# Patient Record
Sex: MALE | Race: WHITE | URBAN - METROPOLITAN AREA
[De-identification: names, ages, dates, MRNs, and addresses within clinical notes are randomized per-mention and may not be internally consistent; named-entity substitution may affect disease eponyms.]

---

## 2022-07-14 ENCOUNTER — NEW PATIENT (OUTPATIENT)
Dept: URBAN - METROPOLITAN AREA CLINIC 49 | Facility: CLINIC | Age: 77
End: 2022-07-14

## 2022-07-14 DIAGNOSIS — D31.32: ICD-10-CM

## 2022-07-14 DIAGNOSIS — H43.813: ICD-10-CM

## 2022-07-14 DIAGNOSIS — H52.4: ICD-10-CM

## 2022-07-14 DIAGNOSIS — H18.513: ICD-10-CM

## 2022-07-14 DIAGNOSIS — H26.493: ICD-10-CM

## 2022-07-14 PROCEDURE — 92004 COMPRE OPH EXAM NEW PT 1/>: CPT

## 2022-07-14 PROCEDURE — 92015 DETERMINE REFRACTIVE STATE: CPT

## 2022-07-14 ASSESSMENT — VISUAL ACUITY
OD_GLARE: 20/50
OS_CC: 20/20
OD_SC: J1
OS_GLARE: 20/20
OU_CC: J1+
OU_SC: J1
OS_CC: J1+
OS_SC: J1
OS_SC: 20/20
OD_SC: 20/20
OU_SC: 20/20
OD_GLARE: 20/20
OU_CC: 20/20
OD_CC: J1+
OS_GLARE: 20/40
OD_CC: 20/20-1

## 2022-07-14 ASSESSMENT — KERATOMETRY
OS_AXISANGLE_DEGREES: 0
OS_AXISANGLE2_DEGREES: 90
OD_K1POWER_DIOPTERS: 45.00
OS_K1POWER_DIOPTERS: 44.75
OD_K2POWER_DIOPTERS: 45.25
OD_AXISANGLE2_DEGREES: 78
OS_K2POWER_DIOPTERS: 44.75
OD_AXISANGLE_DEGREES: 168

## 2022-07-14 ASSESSMENT — TONOMETRY
OD_IOP_MMHG: 12
OS_IOP_MMHG: 12

## 2022-10-10 NOTE — PATIENT DISCUSSION
The patient notes gradual worsening of visual acuity. 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  Recommend observation.

## 2022-12-12 NOTE — PATIENT DISCUSSION
The patient notes gradual worsening of visual acuity. 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  Will treat if PRN.  Recommend observation.

## 2022-12-12 NOTE — PATIENT DISCUSSION
Advised patient to continue to annual exams with Dr. Noble Jeter for general eye care with updated refraction.

## 2023-01-23 NOTE — PATIENT DISCUSSION
Advised patient to continue annual exams with Dr. Debbi Jones for general eyecare with updated refraction.

## 2023-08-18 ENCOUNTER — COMPREHENSIVE EXAM (OUTPATIENT)
Dept: URBAN - METROPOLITAN AREA CLINIC 53 | Facility: CLINIC | Age: 78
End: 2023-08-18

## 2023-08-18 DIAGNOSIS — H26.493: ICD-10-CM

## 2023-08-18 DIAGNOSIS — H43.813: ICD-10-CM

## 2023-08-18 DIAGNOSIS — H18.513: ICD-10-CM

## 2023-08-18 DIAGNOSIS — H52.4: ICD-10-CM

## 2023-08-18 DIAGNOSIS — D31.32: ICD-10-CM

## 2023-08-18 PROCEDURE — 92014 COMPRE OPH EXAM EST PT 1/>: CPT

## 2023-08-18 PROCEDURE — 92015 DETERMINE REFRACTIVE STATE: CPT

## 2023-08-18 ASSESSMENT — KERATOMETRY
OS_K1POWER_DIOPTERS: 44.75
OD_AXISANGLE_DEGREES: 168
OS_AXISANGLE2_DEGREES: 90
OD_K1POWER_DIOPTERS: 45.00
OS_AXISANGLE_DEGREES: 0
OS_K2POWER_DIOPTERS: 44.75
OD_AXISANGLE2_DEGREES: 78
OD_K2POWER_DIOPTERS: 45.25

## 2023-08-18 ASSESSMENT — VISUAL ACUITY
OD_GLARE: 20/25
OD_GLARE: 20/20
OD_CC: 20/20
OS_CC: 20/40
OS_GLARE: 20/30
OU_CC: J1
OS_GLARE: 20/25

## 2023-08-18 ASSESSMENT — TONOMETRY
OS_IOP_MMHG: 13
OD_IOP_MMHG: 13

## 2024-10-02 ENCOUNTER — COMPREHENSIVE EXAM (OUTPATIENT)
Dept: URBAN - METROPOLITAN AREA CLINIC 49 | Facility: LOCATION | Age: 79
End: 2024-10-02

## 2024-10-02 DIAGNOSIS — D31.32: ICD-10-CM

## 2024-10-02 DIAGNOSIS — H43.02: ICD-10-CM

## 2024-10-02 DIAGNOSIS — H52.203: ICD-10-CM

## 2024-10-02 DIAGNOSIS — H52.4: ICD-10-CM

## 2024-10-02 DIAGNOSIS — H16.223: ICD-10-CM

## 2024-10-02 DIAGNOSIS — H26.493: ICD-10-CM

## 2024-10-02 DIAGNOSIS — H18.513: ICD-10-CM

## 2024-10-02 DIAGNOSIS — H43.813: ICD-10-CM

## 2024-10-02 DIAGNOSIS — H02.831: ICD-10-CM

## 2024-10-02 DIAGNOSIS — H02.834: ICD-10-CM

## 2024-10-02 PROCEDURE — 99214 OFFICE O/P EST MOD 30 MIN: CPT

## 2024-10-02 PROCEDURE — 92015 DETERMINE REFRACTIVE STATE: CPT
